# Patient Record
Sex: MALE | Race: WHITE | HISPANIC OR LATINO | Employment: FULL TIME | URBAN - METROPOLITAN AREA
[De-identification: names, ages, dates, MRNs, and addresses within clinical notes are randomized per-mention and may not be internally consistent; named-entity substitution may affect disease eponyms.]

---

## 2018-07-16 ENCOUNTER — OCCUPATIONAL MEDICINE (OUTPATIENT)
Dept: URGENT CARE | Facility: CLINIC | Age: 33
End: 2018-07-16
Payer: COMMERCIAL

## 2018-07-16 ENCOUNTER — APPOINTMENT (OUTPATIENT)
Dept: RADIOLOGY | Facility: IMAGING CENTER | Age: 33
End: 2018-07-16
Attending: INTERNAL MEDICINE
Payer: COMMERCIAL

## 2018-07-16 VITALS
HEIGHT: 68 IN | RESPIRATION RATE: 16 BRPM | SYSTOLIC BLOOD PRESSURE: 114 MMHG | OXYGEN SATURATION: 98 % | WEIGHT: 175 LBS | BODY MASS INDEX: 26.52 KG/M2 | HEART RATE: 72 BPM | DIASTOLIC BLOOD PRESSURE: 70 MMHG | TEMPERATURE: 98.5 F

## 2018-07-16 DIAGNOSIS — S90.00XA CONTUSION OF ANKLE, INITIAL ENCOUNTER: ICD-10-CM

## 2018-07-16 DIAGNOSIS — Z02.89 ENCOUNTER FOR OCCUPATIONAL HEALTH ASSESSMENT: ICD-10-CM

## 2018-07-16 LAB
AMP AMPHETAMINE: NORMAL
BREATH ALCOHOL COMMENT: NORMAL
COC COCAINE: NORMAL
INT CON NEG: NORMAL
INT CON POS: NORMAL
MET METHAMPHETAMINES: NORMAL
OPI OPIATES: NORMAL
PCP PHENCYCLIDINE: NORMAL
POC BREATHALIZER: 0 PERCENT (ref 0–0.01)
POC DRUG COMMENT 753798-OCCUPATIONAL HEALTH: NORMAL
THC: NORMAL

## 2018-07-16 PROCEDURE — 73610 X-RAY EXAM OF ANKLE: CPT | Mod: TC,FY,RT | Performed by: INTERNAL MEDICINE

## 2018-07-16 PROCEDURE — 82075 ASSAY OF BREATH ETHANOL: CPT | Performed by: INTERNAL MEDICINE

## 2018-07-16 PROCEDURE — 80305 DRUG TEST PRSMV DIR OPT OBS: CPT | Performed by: INTERNAL MEDICINE

## 2018-07-16 PROCEDURE — 99203 OFFICE O/P NEW LOW 30 MIN: CPT | Performed by: INTERNAL MEDICINE

## 2018-07-16 ASSESSMENT — PAIN SCALES - GENERAL: PAINLEVEL: 6=MODERATE PAIN

## 2018-07-16 NOTE — LETTER
"EMPLOYEE’S CLAIM FOR COMPENSATION/ REPORT OF INITIAL TREATMENT  FORM C-4    EMPLOYEE’S CLAIM - PROVIDE ALL INFORMATION REQUESTED   First Name  Rojas Last Name  Tamela Chávez Birthdate                    1985                Sex  male Claim Number   Home Address  [address] Age  32 y.o. Height  1.727 m (5' 8\") Weight  79.4 kg (175 lb) [facility]     [facility] [place] Zip  [address] Telephone  [phone] (home)    Mailing Address  [address] [place] [facility] Zip  [address] Primary Language Spoken  English    Insurer   Third Party   [employer] Claims   Employee's Occupation (Job Title) When Injury or Occupational Disease Occurred  [employer]    Employer's Name  [employer]  Telephone  [phone]    Employer Address  [address]  [place]  Zip  [address]   Date of Injury  7/16/2018               Hour of Injury  7:15 AM Date Employer Notified  7/16/2018 Last Day of Work after Injury or Occupational Disease  7/16/2018 Supervisor to Whom Injury Reported  [facility]    Address or Location of Accident (if applicable)  [[place]   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 (Be specific an answer in detail. Use additional sheet if necessary)  drop off of forklift on to righ foot    If you believe that you have an occupational disease, when did you first have knowledge of the disability and it relationship to your employment?  n/a Witnesses to the Accident  no      Nature of Injury or Occupational Disease  Contusion  Part(s) of Body Injured or Affected  Foot (R), N/A, N/A    I certify that the above is true and correct to the best of my knowledge and that I have provided this information in order to obtain the benefits of [place] or [place]).  I hereby authorize any physician, chiropractor, surgeon, practitioner, or other person, any hospital, including [facility] or [facility] hospital, any medical service organization, any insurance company, or other institution or organization to release to each other, any medical or other information, including benefits paid or payable, pertinent to this injury or disease, except information relative to diagnosis, treatment and/or counseling for AIDS, psychological conditions, alcohol or controlled substances, for which I must give specific authorization.  A Photostat of this authorization shall be as valid as the original.     Date   Place   Employee’s Signature   THIS REPORT MUST BE COMPLETED AND MAILED WITHIN 3 WORKING DAYS OF TREATMENT   Place  [facility]  Name of Facility  [facility]   Date  7/16/2018 Diagnosis  (S90.00XA) Contusion of ankle, initial encounter Is there evidence the injured employee was under the influence of alcohol and/or another controlled substance at the time of accident?   Hour  8:38 AM Description of Injury or Disease  The encounter diagnosis was Contusion of ankle, initial encounter. No   Treatment  rest, ice, compression, elevation.  Patient to use over-the-counter anti-inflammatories.  Return to clinic for follow-up in 3 days  Have you advised the patient to remain off work five days or more? No   X-Ray Findings  Negative   If Yes   From Date  To Date      From information given by the employee, together with medical evidence, can you directly connect this injury or occupational disease as job incurred?  Yes If No Full Duty    Modified Duty  Yes   Is additional medical care by a physician indicated?  Yes If Modified Duty, Specify any Limitations / Restrictions      Do you know of any previous injury or disease contributing to this condition or occupational disease?                            No   Date  7/16/2018 Print  "Doctor’s Name Branden Marsh M.D. I certify the employer’s copy of  this form was mailed on:   Address  [address] Insurer’s Use Only     [facility] Zip  [address]    Provider’s Tax ID Number  [id number] Telephone  Dept: [phone]            e-Signature: Dr. Branden Marsh, Medical Director Degree  MD        ORIGINAL-TREATING PHYSICIAN OR CHIROPRACTOR    PAGE 2-INSURER/TPA    PAGE 3-EMPLOYER    PAGE 4-EMPLOYEE             Form C-4 (rev10/07)              BRIEF DESCRIPTION OF RIGHTS AND BENEFITS  (Pursuant to [place] 616C.[place])    Notice of Injury or Occupational Disease (Incident Report Form C-1): If an injury or occupational disease (OD) arises out of and in the  course of employment, you must provide written notice to your employer as soon as practicable, but no later than 7 days after the accident or  OD. Your employer shall maintain a sufficient supply of the required forms.    Claim for Compensation (Form C-4): If medical treatment is sought, the form C-4 is available at the place of initial treatment. A completed  \"Claim for Compensation\" (Form C-4) must be filed within 90 days after an accident or OD. The treating physician or chiropractor must,  within 3 working days after treatment, complete and mail to the employer, the employer's insurer and third-party , the Claim for  Compensation.    Medical Treatment: If you require medical treatment for your on-the-job injury or OD, you may be required to select a physician or  chiropractor from a list provided by your workers’ compensation insurer, if it has contracted with an Organization for Managed Care (MCO) or  Preferred Provider Organization (PPO) or providers of health care. If your employer has not entered into a contract with an MCO or PPO, you  may select a physician or chiropractor from the Panel of Physicians and Chiropractors. Any medical costs related to your industrial injury or  OD will be paid by " your insurer.    Temporary Total Disability (TTD): If your doctor has certified that you are unable to work for a period of at least 5 consecutive days, or 5  cumulative days in a 20-day period, or places restrictions on you that your employer does not accommodate, you may be entitled to TTD  compensation.    Temporary Partial Disability (TPD): If the wage you receive upon reemployment is less than the compensation for TTD to which you are  entitled, the insurer may be required to pay you TPD compensation to make up the difference. TPD can only be paid for a maximum of 24  months.    Permanent Partial Disability (PPD): When your medical condition is stable and there is an indication of a PPD as a result of your injury or  OD, within 30 days, your insurer must arrange for an evaluation by a rating physician or chiropractor to determine the degree of your PPD. The  amount of your PPD award depends on the date of injury, the results of the PPD evaluation and your age and wage.    Permanent Total Disability (PTD): If you are medically certified by a treating physician or chiropractor as permanently and totally disabled  and have been granted a PTD status by your insurer, you are entitled to receive monthly benefits not to exceed 66 2/3% of your average  monthly wage. The amount of your PTD payments is subject to reduction if you previously received a PPD award.    Vocational Rehabilitation Services: You may be eligible for vocational rehabilitation services if you are unable to return to the job due to a  permanent physical impairment or permanent restrictions as a result of your injury or occupational disease.    Transportation and Per [place] Reimbursement: You may be eligible for travel expenses and per clark associated with medical treatment.    Reopening: You may be able to reopen your claim if your condition worsens after claim closure.    Appeal Process: If you disagree with a written determination issued by the  insurer or the insurer does not respond to your request, you may  appeal to the Department of Administration, , by following the instructions contained in your determination letter. You must  appeal the determination within 70 days from the date of the determination letter at [address], [address], [place], [place]  [address], or [address], [address], [place], [place] [address]. If you disagree with the  decision, you may appeal to the  Department of Administration, . You must file your appeal within 30 days from the date of the  decision  letter at [address], [address], [place], [place] [address], or [address], [address], [place], [place] [address]. If you  disagree with a decision of an , you may file a petition for judicial review with the District Court. You must do so within 30  days of the Appeal Officer’s decision. You may be represented by an  at your own expense or you may contact the [facility] for possible  representation.    [place]  for Injured Workers (NAIW): If you disagree with a  decision, you may request that NAIW represent you  without charge at an  Hearing. For information regarding denial of benefits, you may contact the [facility] at: [address], [address], [place] [address], [phone], or [address], [address], [place] [address], [phone]    To File a Complaint with the Division: If you wish to file a complaint with the  of the Division of Industrial Relations (DIR),  please contact the Workers’ Compensation Section, [address], [place], [place] [address], telephone [phone]) [phone], or  [address], [place] [address], telephone [phone] [phone].    For assistance with Workers’ Compensation Issues: you may contact the Office of the Governor  Consumer Health Assistance, [address], [address], [place] [address], Toll Free 1-718.447.4097, Web site: http://govcha.[facility].nv., E-mail  [email or web address].[email or web address].                                                                                                                                                                                                                                   __________________________________________________________________                                                                   _________________                Employee Name / Signature                                                                                                                                                       Date                                                                                                                                                                                                     D-2 (rev. 10/07)

## 2018-07-16 NOTE — LETTER
[facility] [facility] [place] [address]  Phone:  [phone] - Fax:  [phone]   [facility] Progress Report and Disability Certification  Date of Service: 2018   No Show:  No  Date / Time of Next Visit: 2018   Claim Information   Patient Name: Rojas Chávez  Claim Number:     Employer: [employer]  Date of Injury: 2018     Insurer / TPA: [employer]  ID / SSN:     Occupation: [employer]  Diagnosis: The encounter diagnosis was Contusion of ankle, initial encounter.    Medical Information   Related to Industrial Injury? Yes    Subjective Complaints:  Patient is a 32-year-old male presents today with right foot pain.  Patient was at work when a header being fell off the back of a truck onto his right foot.  Patient was wearing steel toed boots but unfortunately the being hit him above that on his ankle.  Patient has difficulty bearing weight.  There is some tenderness and swelling.  Patient denies any prior injury.  Denying any other injuries   Objective Findings: The right foot is tender at the ankle.  Somewhat swollen.  No obvious deformity.  Patient can bear weight.   Pre-Existing Condition(s):     Assessment:   Initial Visit    Status: Additional Care Required  Permanent Disability:No    Plan: Diagnostics    Diagnostics: X-ray    Comments:       Disability Information   Status: Released to Restricted Duty    From:  2018  Through: 2018 Restrictions are: Temporary   Physical Restrictions   Sitting:    Standing:  < or = to 1 hr/day Stooping:    Bending:      Squatting:    Walking:  < or = to 2 hrs/day Climbin hrs/day Pushin hrs/day   Pullin hrs/day Other:    Reaching Above Shoulder (L):   Reaching Above Shoulder (R):       Reaching Below Shoulder (L):    Reaching Below Shoulder (R):      Not to exceed Weight Limits   Carrying(hrs):   Weight Limit(lb): < or = to 25 pounds  Lifting(hrs):   Weight  Limit(lb): < or = to 25 pounds   Comments:      Repetitive Actions   Hands: i.e. Fine Manipulations from Grasping:     Feet: i.e. Operating Foot Controls: < or = to 1 hr/day   Driving / Operate Machinery:     Physician Name: Branden Marsh M.D. Physician Signature:   e-Signature: Dr. Branden Marsh, Medical Director   Clinic Name / Location: [address]  [place] [address] Clinic Phone Number: Dept: [phone]   Appointment Time: 8:15 Am Visit Start Time: 8:38 AM   Check-In Time:  8:25 Am Visit Discharge Time: 9:35 AM   Original-Treating Physician or Chiropractor    Page 2-Insurer/TPA    Page 3-Employer    Page 4-Employee

## 2018-07-16 NOTE — PROGRESS NOTES
"Rojas Chávez is a 32 y.o. male who presents for Foot Problem (wood fell on R foot this am )    Patient is a 32-year-old male presents today with right foot pain.  Patient was at work when a header being fell off the back of a truck onto his right foot.  Patient was wearing steel toed boots but unfortunately the being hit him above that on his ankle.  Patient has difficulty bearing weight.  There is some tenderness and swelling.  Patient denies any prior injury.  Denying any other injuries  HPI    PMH:  has no past medical history on file.  MEDS:   Current Outpatient Prescriptions:   •  methylPREDNISolone (MEDROL, LEEANNE,) 4 MG tablet, As directed, Disp: 1 Kit, Rfl: 0  •  naproxen (NAPROSYN) 500 MG TABS, Take 1 Tab by mouth 2 times a day, with meals., Disp: 30 Tab, Rfl: 2  •  naproxen (NAPROSYN) 500 MG TABS, Take 1 Tab by mouth 2 times a day, with meals., Disp: 40 Tab, Rfl: 0  ALLERGIES: No Known Allergies  SURGHX: History reviewed. No pertinent surgical history.  SOCHX:  reports that he has been smoking Cigarettes.  He has never used smokeless tobacco.  FH: Family history was reviewed, no pertinent findings to report    Review of Systems   Musculoskeletal: Positive for joint pain.   All other systems reviewed and are negative.    No Known Allergies   Objective:   /70   Pulse 72   Temp 36.9 °C (98.5 °F)   Resp 16   Ht 1.727 m (5' 8\")   Wt 79.4 kg (175 lb)   SpO2 98%   BMI 26.61 kg/m²   Physical Exam   Constitutional: He is oriented to person, place, and time. He appears well-developed and well-nourished. No distress.   HENT:   Head: Normocephalic and atraumatic.   Pulmonary/Chest: Effort normal. No respiratory distress.   Musculoskeletal:        Left ankle: He exhibits decreased range of motion and swelling. He exhibits no deformity, no laceration and normal pulse. Tenderness. Achilles tendon normal.        Feet:    Neurological: He is alert and oriented to person, place, and time.   Skin: Skin " is warm and dry.   Psychiatric: He has a normal mood and affect. His behavior is normal. Judgment and thought content normal.   Nursing note and vitals reviewed.    The right foot is tender at the ankle.  Somewhat swollen.  No obvious deformity.  Patient can bear weight.   Assessment/Plan:   Assessment    1. Contusion of ankle, initial encounter  - DX-ANKLE 3+ VIEWS RIGHT; Negative  Differential diagnosis, natural history, supportive care, and indications for immediate follow-up discussed.    RICE  OTC ibuprofen  Follow up in 3 days

## 2018-07-19 ENCOUNTER — OCCUPATIONAL MEDICINE (OUTPATIENT)
Dept: URGENT CARE | Facility: CLINIC | Age: 33
End: 2018-07-19
Payer: COMMERCIAL

## 2018-07-19 VITALS
RESPIRATION RATE: 16 BRPM | SYSTOLIC BLOOD PRESSURE: 118 MMHG | DIASTOLIC BLOOD PRESSURE: 70 MMHG | BODY MASS INDEX: 26.52 KG/M2 | TEMPERATURE: 98.2 F | HEIGHT: 68 IN | HEART RATE: 84 BPM | WEIGHT: 175 LBS | OXYGEN SATURATION: 98 %

## 2018-07-19 DIAGNOSIS — S90.31XD CONTUSION OF RIGHT FOOT, SUBSEQUENT ENCOUNTER: ICD-10-CM
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
## 2018-07-19 DIAGNOSIS — S90.01XD CONTUSION OF RIGHT ANKLE, SUBSEQUENT ENCOUNTER: ICD-10-CM

## 2018-07-19 PROCEDURE — 99213 OFFICE O/P EST LOW 20 MIN: CPT | Mod: 29 | Performed by: NURSE PRACTITIONER

## 2018-07-19 NOTE — LETTER
"   [facility] [place] [facility]  [address] - [place] [address]  Phone:  [phone] - Fax:  [phone]   [facility] Progress Report and Disability Certification  Date of Service: 7/19/2018   No Show:  No  Date / Time of Next Visit: 7/24/2018   Claim Information   Patient Name: Rojas Chávez  Claim Number:     Employer: [employer]  Date of Injury: 7/16/2018     Insurer / TPA: [employer]  ID / SSN:     Occupation: [employer]  Diagnosis: Diagnoses of Contusion of right ankle, subsequent encounter, Contusion of right foot, subsequent encounter, and Work related injury were pertinent to this visit.    Medical Information   Related to Industrial Injury? Yes    Subjective Complaints:  DOI 07/16/18. Heavy piece of wood ( 6\"x12\" x 9') fell onto top of right foot/ ankle. He has been able to work light duty. Pain is improved. He is sleeping well. Able to walk without difficulty. Pain 2/10. Takes ibuprofen occasionally for pain. Pain is worse in the morning but improves during the day after he is walking. He has not done anything else to treat - no ice or elevation. He believes he will be able to return to his normal job duties without difficulty.    Objective Findings: Inspection of right ankle / foot normal. No ecchymosis or ADAMS. Mild tenderness with palpation. FROM without difficulty. No crepius. Neg talar tilt and anterior drawer. Neg achilles tenderness. Pulse Right DP +2    Pre-Existing Condition(s):     Assessment:   Condition Improved    Status: Additional Care Required  Permanent Disability:No    Plan:   Comments:NSAIDS prn     Diagnostics:      Comments:       Disability Information   Status: Released to Full Duty  Comments:Trial of full duty activity.     From:  7/19/2018  Through: 7/24/2018 Restrictions are:     Physical Restrictions   Sitting:    Standing:    Stooping:    Bending:      Squatting:    Walking:    Climbing:    Pushing:      "   Pulling:    Other:    Reaching Above Shoulder (L):   Reaching Above Shoulder (R):       Reaching Below Shoulder (L):    Reaching Below Shoulder (R):      Not to exceed Weight Limits   Carrying(hrs):   Weight Limit(lb):   Lifting(hrs):   Weight  Limit(lb):     Comments:      Repetitive Actions   Hands: i.e. Fine Manipulations from Grasping:     Feet: i.e. Operating Foot Controls:     Driving / Operate Machinery:     Physician Name: MCKENNA KeanePEDDIE Physician Signature: ADAM Chery e-Signature: Dr. Branden Marsh, Medical Director   Clinic Name / Location: [address]  [place] [address] Clinic Phone Number: Dept: [phone]   Appointment Time: 8:00 Am Visit Start Time: 8:11 AM   Check-In Time:  8:06 Am Visit Discharge Time: 8:25 AM    Original-Treating Physician or Chiropractor    Page 2-Insurer/TPA    Page 3-Employer    Page 4-Employee

## 2018-07-19 NOTE — PROGRESS NOTES
"Subjective:      Rojas Chávez is a 32 y.o. male who presents with Leg Injury (folllow up )      Denies past medical, surgical or family history that is significant to today's problem.   RX or OTC medications reviewed with patient today.   No Known Allergies         HPI DOI 07/16/18. Heavy piece of wood ( 6\"x12\" x 9') fell onto top of right foot/ ankle. He has been able to work light duty. Pain is improved. He is sleeping well. Able to walk without difficulty. Pain 2/10. Takes ibuprofen occasionally for pain. Pain is worse in the morning but improves during the day after he is walking. He has not done anything else to treat - no ice or elevation. He believes he will be able to return to his normal job duties without difficulty.     ROS    Denies numbness, tingling or weakness in right foot/ ankle/ leg     Objective:     /70   Pulse 84   Temp 36.8 °C (98.2 °F)   Resp 16   Ht 1.727 m (5' 8\")   Wt 79.4 kg (175 lb)   SpO2 98%   BMI 26.61 kg/m²      Physical Exam   Constitutional: Vital signs are normal. He appears well-developed and well-nourished. He is cooperative.   HENT:   Head: Normocephalic.   Eyes: EOM are normal. Pupils are equal, round, and reactive to light.   Neck: Normal range of motion.   Neurological: He is alert.   Skin: Skin is warm. Capillary refill takes less than 2 seconds.   Psychiatric: He has a normal mood and affect. His behavior is normal. Thought content normal.   Nursing note and vitals reviewed.      Inspection of right ankle / foot normal. No ecchymosis or ADAMS. Mild tenderness with palpation. FROM without difficulty. No crepius. Neg talar tilt and anterior drawer. Neg achilles tenderness. Pulse Right DP +2        Assessment/Plan:     1. Contusion of right ankle, subsequent encounter      2. Contusion of right foot, subsequent encounter      3. Work related injury    See [place] D39     "

## 2018-07-24 ENCOUNTER — OCCUPATIONAL MEDICINE (OUTPATIENT)
Dept: URGENT CARE | Facility: CLINIC | Age: 33
End: 2018-07-24
Payer: COMMERCIAL

## 2018-07-24 VITALS
HEART RATE: 69 BPM | OXYGEN SATURATION: 97 % | WEIGHT: 175 LBS | TEMPERATURE: 97.3 F | DIASTOLIC BLOOD PRESSURE: 68 MMHG | HEIGHT: 68 IN | RESPIRATION RATE: 14 BRPM | SYSTOLIC BLOOD PRESSURE: 122 MMHG | BODY MASS INDEX: 26.52 KG/M2

## 2018-07-24 DIAGNOSIS — S90.01XD CONTUSION OF RIGHT ANKLE, SUBSEQUENT ENCOUNTER: ICD-10-CM

## 2018-07-24 DIAGNOSIS — S90.31XD CONTUSION OF RIGHT FOOT, SUBSEQUENT ENCOUNTER: ICD-10-CM

## 2018-07-24 PROCEDURE — 99213 OFFICE O/P EST LOW 20 MIN: CPT | Mod: 29 | Performed by: NURSE PRACTITIONER

## 2018-07-24 NOTE — PROGRESS NOTES
"Subjective:      Rojas Chávez is a 32 y.o. male who presents with Ankle Injury ( fv for right ankle)      DOI: 7/1618  Today is patient's third visit.  Injury occurred when a heavy beam fell on the right foot and ankle.  He had improved significantly at the last visit and was given a trial of full duty.  Patient states he has tolerated that well and continues to improve. Stats he has slight pain in the morning but this has not interfered with being able to do full duty.      Ankle Injury    Incident onset: 7/16/18. The incident occurred at work. The injury mechanism was a direct blow. Pain location: right foot and ankle. The patient is experiencing no pain. The treatment provided significant relief.       Review of Systems   Musculoskeletal:        Very mild intermittent pain to the medial right foot   All other systems reviewed and are negative.         Objective:     /68   Pulse 69   Temp 36.3 °C (97.3 °F)   Resp 14   Ht 1.727 m (5' 8\")   Wt 79.4 kg (175 lb)   SpO2 97%   BMI 26.61 kg/m²      Physical Exam   Constitutional: He appears well-developed and well-nourished.   Musculoskeletal:        Right foot: There is normal range of motion, no tenderness, no swelling and no deformity.        Feet:    Very mild intermittent pain; this is not reproduced with palpation or ambulation at this time.    Skin: Skin is warm and dry. Capillary refill takes less than 2 seconds.   Psychiatric: He has a normal mood and affect. His behavior is normal. Judgment and thought content normal.   Vitals reviewed.      No STS or deformity to the affected area. No discoloration. Gait is even and steady. Full ROM in the ankle without pain.        Patient has been tolerating full duty without difficulty and is improving with resuming full duty.  Will discharge at this time for MMI.  May continue ibuprofen when needed. Return for recurrent symptoms.    Assessment/Plan:     1. Contusion of right ankle, subsequent " encounter    Released for MMI  Ibuprofen when needed   Follow up for recurrent symptoms.    2. Contusion of right foot, subsequent encounter    Released for MMI  Ibuprofen when needed   Follow up for recurrent symptoms.

## 2018-07-24 NOTE — LETTER
[facility]  [address] - [place] [address]  Phone:  [phone] - Fax:  [phone]   [facility] Progress Report and Disability Certification  Date of Service: 7/24/2018   No Show:  No  Date / Time of Next Visit:     Claim Information   Patient Name: Rojas Chávez  Claim Number:     Employer: [employer]  Date of Injury: 7/16/2018     Insurer / TPA: [employer]  ID / SSN:     Occupation: [employer]  Diagnosis: Diagnoses of Contusion of right ankle, subsequent encounter and Contusion of right foot, subsequent encounter were pertinent to this visit.    Medical Information   Related to Industrial Injury? Yes    Subjective Complaints:  DOI: 7/1618  Today is patient's third visit.  Injury occurred when a heavy beam fell on the right foot and ankle.  He had improved significantly at the last visit and was given a trial of full duty.  Patient states he has tolerated that well and continues to improve. Stats he has slight pain in the morning but this has not interfered with being able to do full duty.    Objective Findings: No STS or deformity to the affected area. No discoloration. Gait is even and steady. Full ROM in the ankle without pain.    Pre-Existing Condition(s):     Assessment:   Condition Improved    Status: Discharged /  MMI  Permanent Disability:No    Plan:      Diagnostics:      Comments:       Disability Information   Status: Released to Full Duty    From:     Through:   Restrictions are:     Physical Restrictions   Sitting:    Standing:    Stooping:    Bending:      Squatting:    Walking:    Climbing:    Pushing:      Pulling:    Other:    Reaching Above Shoulder (L):   Reaching Above Shoulder (R):       Reaching Below Shoulder (L):    Reaching Below Shoulder (R):      Not to exceed Weight Limits   Carrying(hrs):   Weight Limit(lb):   Lifting(hrs):   Weight  Limit(lb):     Comments: Discharged for MMI. Return for  recurrent symptoms.    Repetitive Actions   Hands: i.e. Fine Manipulations from Grasping:     Feet: i.e. Operating Foot Controls:     Driving / Operate Machinery:     Physician Name: Cathey J Hamman, A.P.N. Physician Signature: e-SignHAMMAN, CATHEY J A.P.N. e-Signature: Dr. Branden Marsh, Medical Director   Clinic Name / Location: [address]  [place] [address] Clinic Phone Number: Dept: [phone]   Appointment Time: 8:00 Am Visit Start Time: 8:11 AM   Check-In Time:  8:00 Am Visit Discharge Time:  8:25 AM   Original-Treating Physician or Chiropractor    Page 2-Insurer/TPA    Page 3-Employer    Page 4-Employee

## 2024-09-09 ENCOUNTER — APPOINTMENT (OUTPATIENT)
Dept: RADIOLOGY | Facility: IMAGING CENTER | Age: 39
End: 2024-09-09
Attending: PHYSICIAN ASSISTANT
Payer: COMMERCIAL

## 2024-09-09 ENCOUNTER — NON-PROVIDER VISIT (OUTPATIENT)
Dept: URGENT CARE | Facility: CLINIC | Age: 39
End: 2024-09-09

## 2024-09-09 ENCOUNTER — OCCUPATIONAL MEDICINE (OUTPATIENT)
Dept: URGENT CARE | Facility: CLINIC | Age: 39
End: 2024-09-09
Payer: COMMERCIAL

## 2024-09-09 VITALS
SYSTOLIC BLOOD PRESSURE: 100 MMHG | WEIGHT: 157 LBS | DIASTOLIC BLOOD PRESSURE: 78 MMHG | HEIGHT: 68 IN | BODY MASS INDEX: 23.79 KG/M2 | TEMPERATURE: 97 F | RESPIRATION RATE: 13 BRPM | OXYGEN SATURATION: 99 % | HEART RATE: 65 BPM

## 2024-09-09 DIAGNOSIS — Z02.83 ENCOUNTER FOR EMPLOYMENT-RELATED DRUG TESTING: ICD-10-CM

## 2024-09-09 DIAGNOSIS — M25.511 ACUTE PAIN OF RIGHT SHOULDER: ICD-10-CM

## 2024-09-09 DIAGNOSIS — S46.911A STRAIN OF RIGHT SHOULDER, INITIAL ENCOUNTER: ICD-10-CM

## 2024-09-09 DIAGNOSIS — Z02.1 PRE-EMPLOYMENT DRUG SCREENING: ICD-10-CM

## 2024-09-09 LAB
AMP AMPHETAMINE: NORMAL
BREATH ALCOHOL COMMENT: NORMAL
COC COCAINE: NORMAL
INT CON NEG: NEGATIVE
INT CON POS: POSITIVE
MET METHAMPHETAMINES: NORMAL
OPI OPIATES: NORMAL
PCP PHENCYCLIDINE: NORMAL
POC BREATHALIZER: 0 PERCENT (ref 0–0.01)
POC DRUG COMMENT 753798-OCCUPATIONAL HEALTH: 0
THC: NORMAL

## 2024-09-09 PROCEDURE — 3074F SYST BP LT 130 MM HG: CPT | Performed by: PHYSICIAN ASSISTANT

## 2024-09-09 PROCEDURE — 80305 DRUG TEST PRSMV DIR OPT OBS: CPT | Performed by: PHYSICIAN ASSISTANT

## 2024-09-09 PROCEDURE — 73030 X-RAY EXAM OF SHOULDER: CPT | Mod: TC,RT | Performed by: RADIOLOGY

## 2024-09-09 PROCEDURE — 82075 ASSAY OF BREATH ETHANOL: CPT | Performed by: PHYSICIAN ASSISTANT

## 2024-09-09 PROCEDURE — 99214 OFFICE O/P EST MOD 30 MIN: CPT | Performed by: PHYSICIAN ASSISTANT

## 2024-09-09 PROCEDURE — 3078F DIAST BP <80 MM HG: CPT | Performed by: PHYSICIAN ASSISTANT

## 2024-09-09 NOTE — NON-PROVIDER
Rojas Chávez is a 38 y.o. male here for a non-provider visit for drug screen and alcohol testing      If abnormal was an in office provider notified today (if so, indicate provider)? No    Routed to PCP? No     Patient took copies of drug testing form and breath test form.

## 2024-09-09 NOTE — PROGRESS NOTES
"Subjective     Rojas Chávez is a 38 y.o. male who presents with Shoulder Injury (Patient states that he got hurt at work and hurt his right arm while lifting a piece of wood. Patient states that injury happened on Thursday. Patient states that the pain is causing him to lose mobility. )            HPI    Initial DOI 9/5/2024: Patient states he injured his right shoulder while lifting a large piece of wood that then fell on him.  He states when he is trying to move his arm since then has caused pain.  He went back to work on Friday but noticed pain throughout his shift.  Has not been taking any medication.  No other job or previous injury noted.    Review of Systems   Musculoskeletal:         Right shoulder pain         PMH: No pertinent past medical history to this problem  MEDS: Medications were reviewed in Epic  ALLERGIES: Allergies were reviewed in Epic  SOCHX: Works as a  at [employer]  FH: No pertinent family history to this problem           Objective     /78   Pulse 65   Temp 36.1 °C (97 °F) (Temporal)   Resp 13   Ht 1.727 m (5' 8\")   Wt 71.2 kg (157 lb)   SpO2 99%   BMI 23.87 kg/m²      Physical Exam  Vitals and nursing note reviewed.   Constitutional:       General: He is not in acute distress.     Appearance: Normal appearance. He is well-developed. He is not ill-appearing or toxic-appearing.   HENT:      Head: Normocephalic and atraumatic.      Right Ear: Hearing normal.      Left Ear: Hearing normal.   Cardiovascular:      Rate and Rhythm: Normal rate.   Pulmonary:      Effort: Pulmonary effort is normal.   Musculoskeletal:      Comments: Normal movement in all 4 extremities   Skin:     General: Skin is warm and dry.   Neurological:      Mental Status: He is alert.      Coordination: Coordination normal.   Psychiatric:         Mood and Affect: Mood normal.         Tenderness to palpation through the posterior aspect of the right shoulder.  No step-off deformity or " bruising or swelling.  No bony tenderness to the right clavicle.  Patient has discomfort with movement of the right upper extremity near the shoulder and towards the scapula.  5 of 5  strength.  Neurovascular intact distally.  Pain starts at about shoulder height with movement.     DX SHOULDER  FINDINGS:  Bone mineralization is normal.  There is no evidence of acute fracture.  There is no evidence of dislocation.  There is no evidence of arthropathy.  No abnormalities are identified in the soft tissues.     IMPRESSION:     There is no evidence of acute fracture.         Assessment & Plan        Assessment & Plan  Acute pain of right shoulder    Orders:    DX-SHOULDER 2+ RIGHT; Future    Strain of right shoulder, initial encounter          Would recommend rest, ice, elevation and would also suggest use of sling.  Would recommend intermittent use of the sling with working on range of motion stretching and exercise every couple of hours.  Would suggest not using the right upper extremity while at work for the next few days and come back in on Friday for reevaluation.        Please note that this dictation was created using voice recognition software. I have made every reasonable attempt to correct obvious errors, but I expect that there may be errors of grammar and possibly content that I did not discover before finalizing the note.

## 2024-09-09 NOTE — LETTER
"    EMPLOYEE’S CLAIM FOR COMPENSATION/ REPORT OF INITIAL TREATMENT  FORM C-4  PLEASE TYPE OR PRINT    EMPLOYEE’S CLAIM - PROVIDE ALL INFORMATION REQUESTED   First Name                    MARCO Xie                  Last Name  Tamela Chávez Birthdate                    1985                Sex  Male Claim Number (Insurer’s Use Only)     Home Address  [address] Age  38 y.o. Height  1.727 m (5' 8\") Weight  71.2 kg (157 lb) Social Security Number     [facility] Zip  [address] Telephone  [phone] (home)    Mailing Address  [address] [facility] Zip  [address] Primary Language Spoken  Sami    INSURER   THIRD-PARTY   [employer]   Employee's Occupation (Job Title) When Injury or Occupational Disease Occurred      Employer's Name/Company Name  [employer]  Telephone  [phone]    Office Mail Address (Number and Street)  [address]     Date of Injury (if applicable) 9/5/2024               Hours Injury (if applicable)  11:00 AM Date Employer Notified  9/5/2024 Last Day of Work after Injury or Occupational Disease  9/6/2024 Supervisor to Whom Injury Reported  Sreedhar   Address or Location of Accident (if applicable)  Work [1]   What were you doing at the time of accident? (if applicable)  Levando un bean    How did this injury or occupational disease occur? (Be specific and answer in detail. Use additional sheet if necessary)  Levantando un bean de [id number] Ft se desliso y callo sobre kaylen watkins   If you believe that you have an occupational disease, when did you first have knowledge of the disability and its relationship to your employment?  No Witnesses to the Accident (if applicable)  Samir solomon      Nature of Injury or Occupational Disease  Sprain  Part(s) of Body Injured or Affected  Shoulder (R) Elbow (R) N/A    I " CERTIFY THAT THE ABOVE IS TRUE AND CORRECT TO T HE BEST OF MY KNOWLEDGE AND THAT I HAVE PROVIDED THIS INFORMATION IN ORDER TO OBTAIN THE BENEFITS OF [place]CC[place], OR CHAPTER [place]).  I HEREBY AUTHORIZE ANY PHYSICIAN, CHIROPRACTOR, SURGEON, PRACTITIONER OR ANY OTHER PERSON, ANY HOSPITAL, INCLUDING [facility] OR [facility], ANY  MEDICAL SERVICE ORGANIZATION, ANY INSURANCE COMPANY, OR OTHER INSTITUTION OR ORGANIZATION TO RELEASE TO EACH OTHER, ANY MEDICAL OR OTHER INFORMATION, INCLUDING BENEFITS PAID OR PAYABLE, PERTINENT TO THIS INJURY OR DISEASE, EXCEPT INFORMATION RELATIVE TO DIAGNOSIS, TREATMENT AND/OR COUNSELING FOR AIDS, PSYCHOLOGICAL CONDITIONS, ALCOHOL OR CONTROLLED SUBSTANCES, FOR WHICH I MUST GIVE SPECIFIC AUTHORIZATION.  A PHOTOSTAT OF THIS AUTHORIZATION SHALL BE VALID AS THE ORIGINAL.     Date 9/9/24   [facility] Employee’s Original or  *Electronic Signature   THIS REPORT MUST BE COMPLETED AND MAILED WITHIN 3 WORKING DAYS OF TREATMENT   Place  [facility]    Name of [facility]   Date 9/9/2024 Diagnosis and Description of Injury or Occupational Disease  (M25.511) Acute pain of right shoulder  (S46.911A) Strain of right shoulder, initial encounter  Diagnoses of Acute pain of right shoulder and Strain of right shoulder, initial encounter were pertinent to this visit. Is there evidence that the injured employee was under the influence of alcohol and/or another controlled substance at the time of accident?  []No  [] Yes (if yes, please explain)   Hour 9:59 AM  No   Treatment: Would recommend rest, ice, elevation and would also suggest use of sling.  Would recommend intermittent use of the sling with working on range of motion stretching and exercise every couple of hours.  Would suggest not using the right upper extremity while at work for the next few days and come back in on Friday  for reevaluation.    Have you advised the patient to remain off work five days or more?   [] Yes Indicate dates: From   To    [] No      If no, is the injured employee capable of: [] full duty [] modified duty                     If modified duty, specify any limitations / restrictions:  Limited use of right upper extremity and should wear sling throughout the day at work.                                                                                                                                                                                                                                                                                                                                                                                                               X-Ray Findings: Negative    From information given by the employee, together with medical evidence, can you directly connect this injury or occupational disease as job incurred?  []Yes   [] No Yes    Is additional medical care by a physician indicated? []Yes [] No  Yes    Do you know of any previous injury or disease contributing to this condition or occupational disease? []Yes [] No (Explain if yes)                          No   Date  9/9/2024 Print Health Care Provider’s Name  Iza Knox P.A.-C. I certify that the employer’s copy of  this form was delivered to the employer on:   Address  [address] [place] [address] INSURER'S USE ONLY                       [facility]  [id number] Provider’s Tax ID Number  [id number]   Telephone  Dept: [phone]    Health Care Provider’s Original or Electronic Signature  e-IZA Thomason P.A.-C. Degree (MD,DO, DC,PARajivC,APRN)  DANIEL  Choose (if applicable)      ORIGINAL - TREATING HEALTHCARE PROVIDER PAGE 2 - INSURER/TPA PAGE 3 - EMPLOYER PAGE 4 - EMPLOYEE             Form C-4 (rev.08/23)

## 2024-09-09 NOTE — LETTER
PHYSICIAN’S AND CHIROPRACTIC PHYSICIAN'S   PROGRESS REPORT CERTIFICATION OF DISABILITY Claim Number:     Social Security Number:    Patient’s Name: Rojas Chávez Date of Injury: 9/5/2024   Employer: [employer] Name of MCO (if applicable):      Patient’s Job Description/Occupation:        Previous Injuries/Diseases/Surgeries Contributing to the Condition:  none      Diagnosis: (M25.511) Acute pain of right shoulder  (S46.391A) Strain of right shoulder, initial encounter      Related to the Industrial Injury? Yes     Explain: Lifting injury at work      Objective Medical Findings: Tenderness to palpation through the posterior aspect of the right shoulder.  No step-off deformity or bruising or swelling.  No bony tenderness to the right clavicle.  Patient has discomfort with movement of the right upper extremity near the shoulder and towards the scapula.  5 of 5  strength.  Neurovascular intact distally.  Pain starts at about shoulder height with movement.         None - Discharged                         Stable  No                 Ratable  No        Generally Improved                         Condition Worsened               X   Condition Same  May Have Suffered a Permanent Disability No     Treatment Plan:    Would recommend rest, ice, elevation and would also suggest use of sling.  Would recommend intermittent use of the sling with working on range of motion stretching and exercise every couple of hours.  Would suggest not using the right upper extremity while at work for the next few days and come back in on Friday for reevaluation.         No Change in Therapy                  PT/OT Prescribed                      Medication May be Used While Working        Case Management                          PT/OT Discontinued    Consultation    Further Diagnostic Studies:    Prescription(s)                 Released to FULL DUTY /No Restrictions on (Date):  From:      Certified TOTALLY  TEMPORARILY DISABLED (Indicate Dates) From:   To:    X  Released to RESTRICTED/Modified Duty on (Date): From: 9/9/2024 To: 9/13/2024  Restrictions Are:         No Sitting    No Standing    No Pulling Other: No use of right upper extremity until next follow up. Wear sling most of the day at work.        No Bending at Waist     No Stooping     No Lifting        No Carrying     No Walking Lifting Restricted to (lbs.):          No Pushing        No Climbing     No Reaching Above Shoulders       Date of Next Visit:  9/13/2024 Date of this Exam: 9/9/2024 Physician/Chiropractic Physician Name: Herbie Knox P.A.-C. Physician/Chiropractic Physician Signature:  Que Barcenas DO MPH                                                                                                                                                                                                            D-39 (Rev. 2/24)

## 2024-09-13 ENCOUNTER — OCCUPATIONAL MEDICINE (OUTPATIENT)
Dept: URGENT CARE | Facility: CLINIC | Age: 39
End: 2024-09-13
Payer: COMMERCIAL

## 2024-09-13 VITALS
RESPIRATION RATE: 14 BRPM | HEART RATE: 74 BPM | OXYGEN SATURATION: 98 % | TEMPERATURE: 97.4 F | WEIGHT: 154 LBS | HEIGHT: 68 IN | DIASTOLIC BLOOD PRESSURE: 68 MMHG | SYSTOLIC BLOOD PRESSURE: 120 MMHG | BODY MASS INDEX: 23.34 KG/M2

## 2024-09-13 DIAGNOSIS — S46.911D STRAIN OF RIGHT SHOULDER, SUBSEQUENT ENCOUNTER: ICD-10-CM

## 2024-09-13 PROCEDURE — 3074F SYST BP LT 130 MM HG: CPT | Performed by: NURSE PRACTITIONER

## 2024-09-13 PROCEDURE — 99213 OFFICE O/P EST LOW 20 MIN: CPT | Performed by: NURSE PRACTITIONER

## 2024-09-13 PROCEDURE — 3078F DIAST BP <80 MM HG: CPT | Performed by: NURSE PRACTITIONER

## 2024-09-13 NOTE — LETTER
"PHYSICIAN’S AND CHIROPRACTIC PHYSICIAN'S   PROGRESS REPORT CERTIFICATION OF DISABILITY Claim Number:     Social Security Number:    Patient’s Name: Rojas Chávez Date of Injury: 9/5/2024   Employer: [employer] Name of MCO (if applicable):      Patient’s Job Description/Occupation:        Previous Injuries/Diseases/Surgeries Contributing to the Condition:   DOI 9/5/2024: \"Patient states he injured his right shoulder while lifting a large piece of wood that then fell on him.  He states when he is trying to move his arm since then has caused pain\" patient returns urgent care for follow-up visit having minimal improvement of his pain.  Continues to have pain in the shoulder with certain range of motion. Has been wearing the sling.  Does have a scheduled appointment with occupational medicine 9/17.  Has been using over-the-counter medications as needed.  Patient has not been back to work as they do not have light duty work.      Diagnosis: (S41.682R) Strain of right shoulder, subsequent encounter      Related to the Industrial Injury? Yes     Explain:        Objective Medical Findings: Right shoulder: No gross deformities, skin without erythema, no edema, no ecchymosis.  Tenderness palpation to the posterior aspect. DROM due to pain.  Fry positive, empty can test negative.         None - Discharged                         Stable  No                 Ratable  No        Generally Improved                         Condition Worsened               X   Condition Same  May Have Suffered a Permanent Disability No     Treatment Plan:    Encouraged to continue with gentle range of motion, heat massage, ice, Tyle Motrin as needed for pain.  Continue with temporary work restrictions.  Will place referral to physical therapy.  At this time we will transfer care to occupational medicine         No Change in Therapy               X   PT/OT Prescribed                      Medication May be Used " While Working        Case Management                          PT/OT Discontinued    Consultation    Further Diagnostic Studies:    Prescription(s)                 Released to FULL DUTY /No Restrictions on (Date):  From:      Certified TOTALLY TEMPORARILY DISABLED (Indicate Dates) From:   To:    X  Released to RESTRICTED/Modified Duty on (Date): From: 9/13/2024 To: 9/17/2024  Restrictions Are:  Temporary      No Sitting    No Standing X   No Pulling Other:         No Bending at Waist     No Stooping X    No Lifting        No Carrying     No Walking Lifting Restricted to (lbs.):       X   No Pushing        No Climbing  X   No Reaching Above Shoulders       Date of Next Visit:  9/17/2024 Date of this Exam: 9/13/2024 Physician/Chiropractic Physician Name: BELINDA Mena.PAllanRAllanNAllan Physician/Chiropractic Physician Signature:  Que Barcenas DO MPH                                                                                                                                                                                                            D-39 (Rev. 2/24)

## 2024-09-14 NOTE — PROGRESS NOTES
"Subjective:   Rojas Chávez is a 38 y.o. male who presents for Arm Injury ([facility], Incident Happened Sept. 5th , Arm Injury )  Lithuanian-speaking,  translating for visit    HPI   DOI 9/5/2024: \"Patient states he injured his right shoulder while lifting a large piece of wood that then fell on him.  He states when he is trying to move his arm since then has caused pain\" patient returns urgent care for follow-up visit having minimal improvement of his pain.  Continues to have pain in the shoulder with certain range of motion. Has been wearing the sling.  Does have a scheduled appointment with occupational medicine 9/17.  Has been using over-the-counter medications as needed.  Patient has not been back to work as they do not have light duty work.      Review of Systems   Musculoskeletal:  Positive for joint pain.       Medications:    This patient does not have an active medication from one of the medication groupers.    Allergies: Patient has no known allergies.    Problem List: Rojas Chávez does not have a problem list on file.    Surgical History:  No past surgical history on file.    Past Social Hx: Rojas Chávez  reports that he has quit smoking. His smoking use included cigarettes. He has never used smokeless tobacco. He reports that he does not drink alcohol and does not use drugs.     Past Family Hx:  Rojas Chávez family history is not on file.     Problem list, medications, and allergies reviewed by myself today in Epic.     Objective:     /68 (BP Location: Left arm, Patient Position: Sitting, BP Cuff Size: Small adult)   Pulse 74   Temp 36.3 °C (97.4 °F) (Temporal)   Resp 14   Ht 1.727 m (5' 8\")   Wt 69.9 kg (154 lb)   SpO2 98%   BMI 23.42 kg/m²     Physical Exam  Constitutional:       Appearance: Normal appearance. He is not ill-appearing or toxic-appearing.   HENT:      Head: Normocephalic.      Right Ear: External ear normal.      Left Ear: " External ear normal.      Nose: Nose normal.      Mouth/Throat:      Lips: Pink.   Eyes:      General: Lids are normal.   Pulmonary:      Effort: Pulmonary effort is normal. No accessory muscle usage.   Musculoskeletal:      Right shoulder: Tenderness present. No swelling, deformity, bony tenderness or crepitus. Decreased range of motion. Normal strength. Normal pulse.      Cervical back: Full passive range of motion without pain.   Neurological:      Mental Status: He is alert and oriented to person, place, and time.   Psychiatric:         Mood and Affect: Mood normal.         Thought Content: Thought content normal.               Right shoulder: No gross deformities, skin without erythema, no edema, no ecchymosis.  Tenderness palpation to the posterior aspect. DROM due to pain.  Fry positive, empty can test negative.    Assessment/Plan:     Diagnosis and associated orders:     1. Strain of right shoulder, subsequent encounter  Referral to Physical Therapy         Comments/MDM:     Encouraged to continue with gentle range of motion, heat massage, ice, Tyle Motrin as needed for pain.  Continue with temporary work restrictions.  Will place referral to physical therapy.  At this time we will transfer care to occupational medicine  Differential diagnosis, natural history, supportive care, and indications for immediate follow-up discussed.              Differential diagnosis, natural history, supportive care, and indications for immediate follow-up discussed.    Advised the patient to follow-up with the primary care physician for recheck, reevaluation, and consideration of further management.    Please note that this dictation was created using voice recognition software. I have made a reasonable attempt to correct obvious errors, but I expect that there are errors of grammar and possibly content that I did not discover before finalizing the note.    This note was electronically signed by Nick GARCIA.

## 2024-09-17 ENCOUNTER — OCCUPATIONAL MEDICINE (OUTPATIENT)
Dept: OCCUPATIONAL MEDICINE | Facility: CLINIC | Age: 39
End: 2024-09-17
Payer: COMMERCIAL

## 2024-09-17 VITALS
BODY MASS INDEX: 23.79 KG/M2 | SYSTOLIC BLOOD PRESSURE: 108 MMHG | TEMPERATURE: 97 F | OXYGEN SATURATION: 90 % | HEIGHT: 68 IN | DIASTOLIC BLOOD PRESSURE: 80 MMHG | RESPIRATION RATE: 16 BRPM | WEIGHT: 157 LBS | HEART RATE: 68 BPM

## 2024-09-17 DIAGNOSIS — S46.911D STRAIN OF RIGHT SHOULDER, SUBSEQUENT ENCOUNTER: ICD-10-CM

## 2024-09-17 PROCEDURE — 3079F DIAST BP 80-89 MM HG: CPT | Performed by: PREVENTIVE MEDICINE

## 2024-09-17 PROCEDURE — 1125F AMNT PAIN NOTED PAIN PRSNT: CPT | Performed by: PREVENTIVE MEDICINE

## 2024-09-17 PROCEDURE — 3074F SYST BP LT 130 MM HG: CPT | Performed by: PREVENTIVE MEDICINE

## 2024-09-17 PROCEDURE — 99213 OFFICE O/P EST LOW 20 MIN: CPT | Performed by: PREVENTIVE MEDICINE

## 2024-09-17 RX ORDER — DICLOFENAC SODIUM 75 MG/1
75 TABLET, DELAYED RELEASE ORAL 2 TIMES DAILY
Qty: 60 TABLET | Refills: 0 | Status: SHIPPED | OUTPATIENT
Start: 2024-09-17

## 2024-09-17 ASSESSMENT — PAIN SCALES - GENERAL: PAINLEVEL: 7=MODERATE-SEVERE PAIN

## 2024-09-17 NOTE — LETTER
PHYSICIAN’S AND CHIROPRACTIC PHYSICIAN'S   PROGRESS REPORT CERTIFICATION OF DISABILITY Claim Number:     Social Security Number:    Patient’s Name: Rojas Chávez Date of Injury: 9/5/2024   Employer: [employer] Name of MCO (if applicable):      Patient’s Job Description/Occupation:        Previous Injuries/Diseases/Surgeries Contributing to the Condition:         Diagnosis: (S44.788C) Strain of right shoulder, subsequent encounter      Related to the Industrial Injury? Yes     Explain:        Objective Medical Findings: Right Shoulder: No gross deformity.  Tenderness over the anterior GH and proximal bicep tendon.  Range of motion diminished about 120 degrees flexion, 90 degrees abduction and mild reduction with internal rotation.  Empty can test positive.  Speed's Test positive.  4+/5 strength in all planes of motion.         None - Discharged                         Stable  No                 Ratable  No        Generally Improved                         Condition Worsened               X   Condition Same  May Have Suffered a Permanent Disability No     Treatment Plan:    Exam concerning for possible rotator cuff tear.  Placed referral for MRI right shoulder without  If MRI negative will refer to physical therapy, if positive for internal derangement will refer to orthopedics  Gentle range of motion exercises demonstrated 2-3 times per day  Prescribed diclofenac 75 mg twice daily and tizanidine 4 mg to use at night as needed  May return to clinic sooner if MRI performed sooner         No Change in Therapy                  PT/OT Prescribed                      Medication May be Used While Working        Case Management                          PT/OT Discontinued    Consultation    Further Diagnostic Studies:    Prescription(s)                 Released to FULL DUTY /No Restrictions on (Date):  From:      Certified TOTALLY TEMPORARILY DISABLED (Indicate Dates) From:   To:    X   Released to RESTRICTED/Modified Duty on (Date): From:   To:    Restrictions Are:  Temporary      No Sitting    No Standing    No Pulling Other: No lift/push/pull greater than 5 pounds with the right arm.  No reaching above shoulder level with the right arm.       No Bending at Waist     No Stooping     No Lifting        No Carrying     No Walking Lifting Restricted to (lbs.):          No Pushing        No Climbing  X   No Reaching Above Shoulders       Date of Next Visit:  10/15/2024 @ 8:30 AM Date of this Exam: 9/17/2024 Physician/Chiropractic Physician Name: Que Barcenas D.O. Physician/Chiropractic Physician Signature:  Que Barcenas DO MPH                                                                                                                                                                                                            D-39 (Rev. 2/24)

## 2024-09-17 NOTE — PROGRESS NOTES
"Subjective:     Rojas Chávez is a 38 y.o. 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  MARGARITA: Patient states he injured his right shoulder while lifting a large piece of wood that then fell on him.  He was seen in urgent care x 2, x-rays of the right shoulder negative for acute findings.    9/17/2024: Patient states overall symptoms are about the same since initial injury.  He continues have significant pain over the anterior shoulder.  R he states it is painful to move his shoulder especially try to go above shoulder level.  Denies any radiating pain, numbness or tingling.  Denies prior significant right shoulder injuries.  He states is not taking any medications or doing any stretches.    ROS: All systems were reviewed on intake form, form was reviewed and signed. See scanned documents in media. Pertinent positives and negatives included in HPI.    PMH: No pertinent past medical history to this problem  MEDS: Medications were reviewed in Epic  ALLERGIES: No Known Allergies  SOCHX: Works as a  at [employer]  FH: No pertinent family history to this problem       Objective:     /80 (BP Location: Right arm, Patient Position: Sitting, BP Cuff Size: Adult)   Pulse 68   Temp 36.1 °C (97 °F)   Resp 16   Ht 1.727 m (5' 8\")   Wt 71.2 kg (157 lb)   SpO2 90%   BMI 23.87 kg/m²     Constitutional: Patient is in no acute distress. Appears well-developed and well-nourished.   Cardiovascular: Normal rate.    Pulmonary/Chest: Effort normal. No respiratory distress.   Neurological: Patient is alert and oriented to person, place, and time.   Skin: Skin is warm and dry.   Psychiatric: Normal mood and affect. Behavior is normal.     Right Shoulder: No gross deformity.  Tenderness over the anterior GH and proximal bicep tendon.  Range of motion " diminished about 120 degrees flexion, 90 degrees abduction and mild reduction with internal rotation.  Empty can test positive.  Speed's Test positive.  4+/5 strength in all planes of motion.    Assessment/Plan:     1. Strain of right shoulder, subsequent encounter  - Referral to Radiology  - MR-SHOULDER-W/O RIGHT; Future  - diclofenac DR (VOLTAREN) 75 MG Tablet Delayed Response; Take 1 Tablet by mouth 2 times a day.  Dispense: 60 Tablet; Refill: 0  - tizanidine (ZANAFLEX) 4 MG Tab; Take 1 Tablet by mouth at bedtime as needed (pain/spasm).  Dispense: 20 Tablet; Refill: 0      Exam concerning for possible rotator cuff tear.  Placed referral for MRI right shoulder without  If MRI negative will refer to physical therapy, if positive for internal derangement will refer to orthopedics  Gentle range of motion exercises demonstrated 2-3 times per day  Prescribed diclofenac 75 mg twice daily and tizanidine 4 mg to use at night as needed  May return to clinic sooner if MRI performed sooner    Work Status: Restricted Duty - see D-39 or other state/federal worker's compensation forms for specific restrictions if applicable  Follow up 4 weeks    Differential diagnosis, natural history, supportive care, and indications for immediate follow-up discussed.    Approximately 32 minutes were spent in reviewing notes, preparing for visit, obtaining history, exam and evaluation, patient counseling/education, and post visit documentation/orders. Significant time was spent completing state/federal worker's compensation forms.

## 2024-10-08 ENCOUNTER — OCCUPATIONAL MEDICINE (OUTPATIENT)
Dept: OCCUPATIONAL MEDICINE | Facility: CLINIC | Age: 39
End: 2024-10-08
Payer: COMMERCIAL

## 2024-10-08 DIAGNOSIS — S46.911D STRAIN OF RIGHT SHOULDER, SUBSEQUENT ENCOUNTER: ICD-10-CM

## 2024-10-08 PROCEDURE — 99213 OFFICE O/P EST LOW 20 MIN: CPT | Performed by: PREVENTIVE MEDICINE

## 2024-11-04 ENCOUNTER — OCCUPATIONAL MEDICINE (OUTPATIENT)
Dept: OCCUPATIONAL MEDICINE | Facility: CLINIC | Age: 39
End: 2024-11-04
Payer: COMMERCIAL

## 2024-11-04 VITALS
TEMPERATURE: 98.2 F | BODY MASS INDEX: 23.95 KG/M2 | WEIGHT: 158 LBS | OXYGEN SATURATION: 96 % | SYSTOLIC BLOOD PRESSURE: 130 MMHG | HEIGHT: 68 IN | HEART RATE: 85 BPM | RESPIRATION RATE: 16 BRPM | DIASTOLIC BLOOD PRESSURE: 72 MMHG

## 2024-11-04 DIAGNOSIS — S46.911D STRAIN OF RIGHT SHOULDER, SUBSEQUENT ENCOUNTER: ICD-10-CM

## 2024-11-04 PROCEDURE — 3075F SYST BP GE 130 - 139MM HG: CPT | Performed by: PREVENTIVE MEDICINE

## 2024-11-04 PROCEDURE — 99213 OFFICE O/P EST LOW 20 MIN: CPT | Performed by: PREVENTIVE MEDICINE

## 2024-11-04 PROCEDURE — 3078F DIAST BP <80 MM HG: CPT | Performed by: PREVENTIVE MEDICINE

## 2024-11-04 NOTE — LETTER
PHYSICIAN’S AND CHIROPRACTIC PHYSICIAN'S   PROGRESS REPORT   CERTIFICATION OF DISABILITY Claim Number:     Social Security Number:    Patient’s Name: Rojas Chávez Date of Injury: 9/5/2024   Employer: [employer] Name of MCO (if applicable):      Patient’s Job Description/Occupation:        Previous Injuries/Diseases/Surgeries Contributing to the Condition:         Diagnosis: (S45.154Y) Strain of right shoulder, subsequent encounter      Related to the Industrial Injury? Yes     Explain:        Objective Medical Findings: Right Shoulder: No gross deformity.  Tenderness over the anterior GH and proximal bicep tendon.  Essentially normal range of motion   MRI Right Shoulder:  No acute bony abnormality and no evidence of internal derangement. Subscapularis tendinopathy without tear. Superior labral degeneration without tear.Mild glenohumeral joint degenerative changes.           None - Discharged                         Stable  No                 Ratable  No        Generally Improved                         Condition Worsened               X   Condition Same  May Have Suffered a Permanent Disability No     Treatment Plan:    Gradual improvement physical therapy.  Will continue conservative management  Continue physical therapy  Gentle range of motion exercises demonstrated 2-3 times per day  Continue diclofenac 75 mg twice daily and tizanidine 4 mg to use at night as needed  If no further significant improvement will refer to orthopedics, at this visit.  If symptoms continue to improve will consider released to full duty           No Change in Therapy                  PT/OT Prescribed                      Medication May be Used While Working        Case Management                          PT/OT Discontinued    Consultation    Further Diagnostic Studies:    Prescription(s)                 Released to FULL DUTY /No Restrictions on (Date):       Certified TOTALLY TEMPORARILY DISABLED  (Indicate Dates) From:   To:    X  Released to RESTRICTED/Modified Duty on (Date): From: 11/4/2024 To: 11/25/2024  Restrictions Are:  Temporary      No Sitting    No Standing    No Pulling Other:  No lift/push/pull greater than 5 pounds with the right arm.  No reaching above shoulder level with the right arm       No Bending at Waist     No Stooping     No Lifting        No Carrying     No Walking Lifting Restricted to (lbs.):          No Pushing        No Climbing     No Reaching Above Shoulders       Date of Next Visit:  11/25/2024 @ 1:45 pm Date of this Exam: 11/4/2024 Physician/Chiropractic Physician Name: Que Barcenas D.O. Physician/Chiropractic Physician Signature:  Que Barcenas DO [facility]:  [address] [place] [address], [address] [place], [place] [address] - Telephone [phone] [facility]:  [address] [place] [address], [address] [place], [place] [address] - Telephone [phone]    [email or web address]  D-39 (Rev. 10/24)

## 2024-11-04 NOTE — PROGRESS NOTES
"Subjective:     Rojas Chávez is a 38 y.o. 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  MARGARITA: Patient states he injured his right shoulder while lifting a large piece of wood that then fell on him.  He was seen in urgent care x 2, x-rays of the right shoulder negative for acute findings.     9/17/2024: Patient states overall symptoms are about the same since initial injury.  He continues have significant pain over the anterior shoulder.  R he states it is painful to move his shoulder especially try to go above shoulder level.  Denies any radiating pain, numbness or tingling.  Denies prior significant right shoulder injuries.  He states is not taking any medications or doing any stretches.    11/4/2024: Patient states overall symptoms have been gradually improving with physical therapy.  He still has significant pain with certain movements of the shoulder.  He states that he has attended 5 of 12 physical therapy visits.  He states that he does have better range of motion than before.    ROS    SOCHX: Works as a  at [employer]  FH: No pertinent family history to this problem.       Objective:     /72   Pulse 85   Temp 36.8 °C (98.2 °F)   Resp 16   Ht 1.727 m (5' 8\")   Wt 71.7 kg (158 lb)   SpO2 96%   BMI 24.02 kg/m²     Constitutional: Patient is in no acute distress. Appears well-developed and well-nourished.   Cardiovascular: Normal rate.    Pulmonary/Chest: Effort normal. No respiratory distress.   Neurological: Patient is alert and oriented to person, place, and time.   Skin: Skin is warm and dry.   Psychiatric: Normal mood and affect. Behavior is normal.     Right Shoulder: No gross deformity.  Tenderness over the anterior GH and proximal bicep tendon.  Essentially normal range of motion   MRI Right Shoulder:  No acute bony abnormality and no evidence of internal derangement. " Subscapularis tendinopathy without tear. Superior labral degeneration without tear.Mild glenohumeral joint degenerative changes.      Assessment/Plan:     1. Strain of right shoulder, subsequent encounter      Gradual improvement physical therapy.  Will continue conservative management  Continue physical therapy  Gentle range of motion exercises demonstrated 2-3 times per day  Continue diclofenac 75 mg twice daily and tizanidine 4 mg to use at night as needed  If no further significant improvement will refer to orthopedics, at this visit.  If symptoms continue to improve will consider released to full duty      Work Status: Restricted Duty - see D-39 or other state/federal worker's compensation forms for specific restrictions if applicable  Follow up 3 weeks    Differential diagnosis, natural history, supportive care, and indications for immediate follow-up discussed.    Approximately 21 minutes were spent in reviewing notes, preparing for visit, obtaining history, exam and evaluation, patient counseling/education, and post visit documentation/orders. Significant time was spent completing state/federal worker's compensation forms.

## 2024-11-25 ENCOUNTER — OCCUPATIONAL MEDICINE (OUTPATIENT)
Dept: OCCUPATIONAL MEDICINE | Facility: CLINIC | Age: 39
End: 2024-11-25
Payer: COMMERCIAL

## 2024-11-25 VITALS
TEMPERATURE: 98 F | HEART RATE: 77 BPM | SYSTOLIC BLOOD PRESSURE: 122 MMHG | BODY MASS INDEX: 24.1 KG/M2 | WEIGHT: 159 LBS | DIASTOLIC BLOOD PRESSURE: 78 MMHG | RESPIRATION RATE: 16 BRPM | HEIGHT: 68 IN

## 2024-11-25 DIAGNOSIS — S46.911D STRAIN OF RIGHT SHOULDER, SUBSEQUENT ENCOUNTER: ICD-10-CM

## 2024-11-25 RX ORDER — DICLOFENAC SODIUM 75 MG/1
75 TABLET, DELAYED RELEASE ORAL 2 TIMES DAILY
Qty: 60 TABLET | Refills: 0 | Status: SHIPPED | OUTPATIENT
Start: 2024-11-25

## 2024-11-25 ASSESSMENT — PAIN SCALES - GENERAL: PAINLEVEL_OUTOF10: 4=SLIGHT-MODERATE PAIN

## 2024-11-25 NOTE — LETTER
PHYSICIAN’S AND CHIROPRACTIC PHYSICIAN'S   PROGRESS REPORT   CERTIFICATION OF DISABILITY Claim Number:     Social Security Number:    Patient’s Name: Rojas Chávez Date of Injury: 9/5/2024   Employer: [employer] Name of MCO (if applicable):      Patient’s Job Description/Occupation:        Previous Injuries/Diseases/Surgeries Contributing to the Condition:         Diagnosis: (S40.112T) Strain of right shoulder, subsequent encounter      Related to the Industrial Injury? Yes     Explain:        Objective Medical Findings: Right Shoulder: No gross deformity.  Tenderness over the anterior GH and proximal bicep tendon.  Essentially normal range of motion, except for internal rotation.  Empty can test weakly positive.  Weakness with abduction, flexion and external rotation.  MRI Right Shoulder:  No acute bony abnormality and no evidence of internal derangement. Subscapularis tendinopathy without tear. Superior labral degeneration without tear.Mild glenohumeral joint degenerative changes.           None - Discharged                         Stable  No                 Ratable  No        Generally Improved                         Condition Worsened                  Condition Same  May Have Suffered a Permanent Disability No     Treatment Plan:    Given exam findings, duration of symptoms despite conservative management will refer to orthopedics  Finish remaining physical therapy visits.  Continue home exercise program  Gentle range of motion exercises demonstrated 2-3 times per day  Continue diclofenac 75 mg twice daily and tizanidine 4 mg to use at night as needed                No Change in Therapy                  PT/OT Prescribed                      Medication May be Used While Working        Case Management                          PT/OT Discontinued    Consultation    Further Diagnostic Studies:    Prescription(s)                 Released to FULL DUTY /No Restrictions on (Date):        Certified TOTALLY TEMPORARILY DISABLED (Indicate Dates) From:   To:    X  Released to RESTRICTED/Modified Duty on (Date): From: 11/25/2024 To: 12/23/2024  Restrictions Are:         No Sitting    No Standing    No Pulling Other:  No lift/push/pull greater than 5 pounds with the right arm. No reaching above shoulder level with the right arm       No Bending at Waist     No Stooping     No Lifting        No Carrying     No Walking Lifting Restricted to (lbs.):          No Pushing        No Climbing     No Reaching Above Shoulders       Date of Next Visit:  12/23/2024 @ 1:45 PM Date of this Exam: 11/25/2024 Physician/Chiropractic Physician Name: Que Barcenas D.O. Physician/Chiropractic Physician Signature:  Que Barcenas DO [facility]:  [address] [place] [address], [address] [place], [place] [address] - Telephone [phone] [facility]:  [address] [place] [address], [address] [place], [place] [address] - Telephone [phone]    [email or web address]  D-39 (Rev. 10/24)

## 2024-11-25 NOTE — PROGRESS NOTES
"Subjective:     Rojas Chávez is a 38 y.o. 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  MARGARITA: Patient states he injured his right shoulder while lifting a large piece of wood that then fell on him.  He was seen in urgent care x 2, x-rays of the right shoulder negative for acute findings.     9/17/2024: Patient states overall symptoms are about the same since initial injury.  He continues have significant pain over the anterior shoulder.  R he states it is painful to move his shoulder especially try to go above shoulder level.  Denies any radiating pain, numbness or tingling.  Denies prior significant right shoulder injuries.  He states is not taking any medications or doing any stretches.     11/4/2024: Patient states overall symptoms have been gradually improving with physical therapy.  He still has significant pain with certain movements of the shoulder.  He states that he has attended 5 of 12 physical therapy visits.  He states that he does have better range of motion than before.    11/25/2024: Patient states overall symptoms are about the same.  Completed 1112 physical therapy visits with no improvements.  Continues have weakness and pain in the shoulder with movements above shoulder level.    ROS    SOCHX: Works as a  at [employer]   FH: No pertinent family history to this problem.       Objective:     /78 (BP Location: Left arm, Patient Position: Sitting, BP Cuff Size: Adult)   Pulse 77   Temp 36.7 °C (98 °F)   Resp 16   Ht 1.727 m (5' 8\")   Wt 72.1 kg (159 lb)   BMI 24.18 kg/m²     Constitutional: Patient is in no acute distress. Appears well-developed and well-nourished.   Cardiovascular: Normal rate.    Pulmonary/Chest: Effort normal. No respiratory distress.   Neurological: Patient is alert and oriented to person, place, and time.   Skin: Skin is warm and " dry.   Psychiatric: Normal mood and affect. Behavior is normal.     Right Shoulder: No gross deformity.  Tenderness over the anterior GH and proximal bicep tendon.  Essentially normal range of motion, except for internal rotation.  Empty can test weakly positive.  Weakness with abduction, flexion and external rotation.  MRI Right Shoulder:  No acute bony abnormality and no evidence of internal derangement. Subscapularis tendinopathy without tear. Superior labral degeneration without tear.Mild glenohumeral joint degenerative changes.      Assessment/Plan:     1. Strain of right shoulder, subsequent encounter  - Referral to Orthopedics  - diclofenac DR (VOLTAREN) 75 MG Tablet Delayed Response; Take 1 Tablet by mouth 2 times a day.  Dispense: 60 Tablet; Refill: 0      Given exam findings, duration of symptoms despite conservative management will refer to orthopedics  Finish remaining physical therapy visits.  Continue home exercise program  Gentle range of motion exercises demonstrated 2-3 times per day  Continue diclofenac 75 mg twice daily and tizanidine 4 mg to use at night as needed           Work Status: Restricted Duty - see D-39 or other state/federal worker's compensation forms for specific restrictions if applicable  Follow up 4 weeks    Differential diagnosis, natural history, supportive care, and indications for immediate follow-up discussed.    Approximately 21 minutes were spent in reviewing notes, preparing for visit, obtaining history, exam and evaluation, patient counseling/education, and post visit documentation/orders. Significant time was spent completing state/federal worker's compensation forms.

## 2024-12-22 DIAGNOSIS — S46.911D STRAIN OF RIGHT SHOULDER, SUBSEQUENT ENCOUNTER: ICD-10-CM

## 2024-12-23 RX ORDER — DICLOFENAC SODIUM 75 MG/1
75 TABLET, DELAYED RELEASE ORAL 2 TIMES DAILY
Qty: 60 TABLET | Refills: 0 | Status: SHIPPED | OUTPATIENT
Start: 2024-12-23